# Patient Record
Sex: FEMALE | Race: ASIAN | Employment: OTHER | URBAN - METROPOLITAN AREA
[De-identification: names, ages, dates, MRNs, and addresses within clinical notes are randomized per-mention and may not be internally consistent; named-entity substitution may affect disease eponyms.]

---

## 2020-02-03 ENCOUNTER — OFFICE VISIT (OUTPATIENT)
Dept: INTERNAL MEDICINE | Facility: CLINIC | Age: 75
End: 2020-02-03
Payer: COMMERCIAL

## 2020-02-03 VITALS
BODY MASS INDEX: 25.64 KG/M2 | OXYGEN SATURATION: 99 % | HEART RATE: 59 BPM | HEIGHT: 61 IN | WEIGHT: 135.8 LBS | TEMPERATURE: 97.9 F | RESPIRATION RATE: 19 BRPM | DIASTOLIC BLOOD PRESSURE: 78 MMHG | SYSTOLIC BLOOD PRESSURE: 130 MMHG

## 2020-02-03 DIAGNOSIS — L21.9 SEBORRHEIC DERMATITIS OF SCALP: Primary | ICD-10-CM

## 2020-02-03 PROCEDURE — 99203 OFFICE O/P NEW LOW 30 MIN: CPT | Performed by: INTERNAL MEDICINE

## 2020-02-03 RX ORDER — KETOCONAZOLE 20 MG/ML
SHAMPOO TOPICAL
Qty: 120 ML | Refills: 0 | Status: SHIPPED | OUTPATIENT
Start: 2020-02-03

## 2020-02-03 ASSESSMENT — MIFFLIN-ST. JEOR: SCORE: 1040.42

## 2020-02-03 NOTE — PROGRESS NOTES
Subjective     Fili Cope is a 74 year old female who presents to clinic today for the following health issues:    HPI      Fili Cope is a 73 y.o. female who comes in with concerns of an itchy scalp and neck. She has had this going on and off since 1 yr. Pt was seen in [facility] ans was recommended Selsun shampoo w/o much relief . She went to [place] for several months and it went away.   unfortunately it come back after returning from [place] . It is localized to her scalp and neck only. She does dye/color her hair every 6 to 8 weeks but has not dyed for 4-6 wks.. Pt was treated with Ketoconazole shampoo with some relief  But has been out of this since more than 1 month      Patient Active Problem List   Diagnosis     S/P knee surgery     Past Surgical History:   Procedure Laterality Date     ARTHROPLASTY KNEE  7/25/2012    Procedure: ARTHROPLASTY KNEE;  Right Total Knee Arthroplasty  ;  Surgeon: Eliseo Catrer MD;  Location: [facility] OR     ARTHROPLASTY KNEE  10/1/2012    Procedure: ARTHROPLASTY KNEE;  Left Total Knee Arthroplasty  ;  Surgeon: Eliseo Carter MD;  Location: [facility] OR     THYROIDECTOMY         Social History     Tobacco Use     Smoking status: Never Smoker     Smokeless tobacco: Never Used   Substance Use Topics     Alcohol use: Yes     Comment: rarely     History reviewed. No pertinent family history.      Current Outpatient Medications   Medication Sig Dispense Refill     ALENDRONATE SODIUM PO Take 70 mg by mouth once a week. Takes on Tuesdays       aspirin 325 MG EC tablet Take 1 tablet by mouth 2 times daily. 70 tablet 0     Atorvastatin Calcium (LIPITOR PO) Take 20 mg by mouth At Bedtime.         calcium carbonate (OS-FLOR 500 MG [place]) 500 MG tablet Take 1,000 mg by mouth daily.       ketoconazole (NIZORAL) 2 % external shampoo Apply topically three times a week 120 mL 0     LEVOTHYROXINE SODIUM PO Take 100 mcg by mouth every morning (before breakfast).      "         Reviewed and updated as needed this visit by Provider         Review of Systems   ROS COMP: CONSTITUTIONAL: NEGATIVE for fever, chills, change in weight  INTEGUMENTARY/SKIN: itchy scalp       Objective    /78   Pulse 59   Temp 97.9  F (36.6  C) (Oral)   Resp 19   Ht 1.537 m (5' 0.5\")   Wt 61.6 kg (135 lb 12.8 oz)   SpO2 99%   BMI 26.09 kg/m    Body mass index is 26.09 kg/m .  Physical Exam   GENERAL: healthy, alert and no distress  Scalp; no dandruff or any scaling or lesions noted at this time        Assessment & Plan     (L21.9) Seborrheic dermatitis of scalp  (primary encounter diagnosis)  Plan:started on ketoconazole (NIZORAL) 2 % external shampoo as directed.explained clearly about the medication,insructions and side effects. Referred to derm-  DERMATOLOGY REFERRAL,            BMI:   Estimated body mass index is 26.09 kg/m  as calculated from the following:    Height as of this encounter: 1.537 m (5' 0.5\").    Weight as of this encounter: 61.6 kg (135 lb 12.8 oz).      Xenia Urbina MD  [facility]        "

## 2020-02-03 NOTE — NURSING NOTE
"/78   Pulse 59   Temp 97.9  F (36.6  C) (Oral)   Resp 19   Ht 1.537 m (5' 0.5\")   Wt 61.6 kg (135 lb 12.8 oz)   SpO2 99%   BMI 26.09 kg/m    Patient in for consultation on Itchy, red scalp and back x's 10 months.  Danielle Levin, CMA    "